# Patient Record
Sex: MALE | Race: WHITE | NOT HISPANIC OR LATINO | Employment: FULL TIME | RURAL
[De-identification: names, ages, dates, MRNs, and addresses within clinical notes are randomized per-mention and may not be internally consistent; named-entity substitution may affect disease eponyms.]

---

## 2023-07-25 ENCOUNTER — OFFICE VISIT (OUTPATIENT)
Dept: FAMILY MEDICINE CLINIC | Facility: CLINIC | Age: 28
End: 2023-07-25
Payer: COMMERCIAL

## 2023-07-25 VITALS
SYSTOLIC BLOOD PRESSURE: 110 MMHG | DIASTOLIC BLOOD PRESSURE: 60 MMHG | BODY MASS INDEX: 24.35 KG/M2 | HEART RATE: 76 BPM | WEIGHT: 189.7 LBS | HEIGHT: 74 IN | OXYGEN SATURATION: 95 %

## 2023-07-25 DIAGNOSIS — Z00.01 ENCOUNTER FOR GENERAL ADULT MEDICAL EXAMINATION WITH ABNORMAL FINDINGS: Primary | ICD-10-CM

## 2023-07-25 DIAGNOSIS — Z13.1 SCREENING FOR DIABETES MELLITUS: ICD-10-CM

## 2023-07-25 DIAGNOSIS — Z11.59 NEED FOR HEPATITIS C SCREENING TEST: ICD-10-CM

## 2023-07-25 DIAGNOSIS — E55.9 VITAMIN D DEFICIENCY: ICD-10-CM

## 2023-07-25 DIAGNOSIS — Z98.890 HISTORY OF CRANIOPLASTY: ICD-10-CM

## 2023-07-25 DIAGNOSIS — L23.7 ALLERGIC CONTACT DERMATITIS DUE TO PLANTS, EXCEPT FOOD: ICD-10-CM

## 2023-07-25 DIAGNOSIS — Z13.220 SCREENING FOR HYPERLIPIDEMIA: ICD-10-CM

## 2023-07-25 DIAGNOSIS — N52.9 ERECTILE DYSFUNCTION, UNSPECIFIED ERECTILE DYSFUNCTION TYPE: ICD-10-CM

## 2023-07-25 DIAGNOSIS — K21.9 GASTROESOPHAGEAL REFLUX DISEASE WITHOUT ESOPHAGITIS: ICD-10-CM

## 2023-07-25 PROCEDURE — 90715 TDAP VACCINE 7 YRS/> IM: CPT | Performed by: FAMILY MEDICINE

## 2023-07-25 PROCEDURE — 99385 PREV VISIT NEW AGE 18-39: CPT | Performed by: FAMILY MEDICINE

## 2023-07-25 PROCEDURE — 90471 IMMUNIZATION ADMIN: CPT | Performed by: FAMILY MEDICINE

## 2023-07-25 RX ORDER — TRIAMCINOLONE ACETONIDE 1 MG/G
1 CREAM TOPICAL 2 TIMES DAILY PRN
Qty: 80 G | Refills: 3 | Status: SHIPPED | OUTPATIENT
Start: 2023-07-25

## 2023-07-25 RX ORDER — TADALAFIL 5 MG/1
5 TABLET ORAL DAILY
Qty: 90 TABLET | Refills: 3 | Status: SHIPPED | OUTPATIENT
Start: 2023-07-25

## 2023-07-25 NOTE — PROGRESS NOTES
"Chief Complaint  Establish Care and Annual Exam    Subjective      Josse Kerr presents to [facility]  History of Present Illness  Patient is new to our practice and he comes in today for annual physical exam and to get established.  He says he feels well overall, although he is getting over some poison ivy on his left hand and forearm and would like prescription cortisone cream for this.  He has a history of erectile dysfunction and has been on tadalafil 5 mg daily for the last few years, and this works well with no adverse effects.    The patient says that he played soccer in high school and received an elbow to the head when he was 15 years of age, causing a \"dent in my skull\".  He is under the impression that there was no fracture, but I do not have records to review at this time.  It sounds like he had a depressed skull fracture, as he states that he subsequently underwent surgery and had a metal plate placed in his head.  However, he seems to indicate that the metal plate was placed mostly for cosmetic purposes, although the history of this is unclear since he was only 15 years of age.  Nonetheless, he says it was an outpatient surgery and he did not require any hospitalization.  He has had vitamin D deficiency in the past, and has GERD but is well controlled with over-the-counter Prilosec 20 mg, which she had been taking daily but now just takes it a couple days a week.  Patient is unsure of his last tetanus booster, so we discussed the importance of getting these every decade and he would like to get one today  Objective   Vital Signs:   Vitals:    07/25/23 1017   BP: 110/60   BP Location: Left arm   Patient Position: Sitting   Cuff Size: Adult   Pulse: 76   SpO2: 95%   Weight: 86 kg (189 lb 11.2 oz)   Height: 188 cm (74\")      /60 (BP Location: Left arm, Patient Position: Sitting, Cuff Size: Adult)   Pulse 76   Ht 188 cm (74\")   Wt 86 kg (189 lb 11.2 oz)   SpO2 95% "   BMI 24.36 kg/m²     Body mass index is 24.36 kg/m².    Review of Systems   Constitutional:  Negative for activity change, appetite change, chills, fever, unexpected weight gain and unexpected weight loss.   HENT:  Negative for ear discharge, ear pain, mouth sores, nosebleeds, rhinorrhea, sinus pressure, sore throat, swollen glands, trouble swallowing and voice change.    Eyes:  Negative for blurred vision, double vision, pain, redness and visual disturbance.   Respiratory:  Negative for cough, chest tightness, shortness of breath and wheezing.    Cardiovascular:  Negative for chest pain, palpitations and leg swelling.        PND, orthopnea   Gastrointestinal:  Positive for GERD. Negative for abdominal distention, abdominal pain, blood in stool, constipation, diarrhea, nausea and vomiting.        Dysphagia, odynophagia   Endocrine: Negative for polydipsia, polyphagia and polyuria.   Genitourinary:  Positive for erectile dysfunction. Negative for dysuria, frequency, hematuria, nocturia, testicular pain and urinary incontinence.   Musculoskeletal:  Negative for arthralgias (unusual/atypica), back pain, gait problem, joint swelling, myalgias and neck pain.   Skin:  Positive for rash. Negative for skin lesions (worrisome/suspicious) and bruise.   Allergic/Immunologic: Negative for food allergies.   Neurological:  Negative for dizziness, tremors, seizures, syncope, weakness, numbness, headache and memory problem.   Hematological:  Negative for adenopathy. Does not bruise/bleed easily.   Psychiatric/Behavioral:  Negative for sleep disturbance, suicidal ideas and depressed mood. The patient is not nervous/anxious.      Past History:  Medical History: has a past medical history of Concussion, Erectile dysfunction, GERD (gastroesophageal reflux disease), History of depression (2013), and Vitamin D deficiency.   Surgical History: has a past surgical history that includes Skull fracture elevation (2010).   Family History:  family history includes Breast cancer in his mother; Colon cancer in his maternal grandfather; ROSALIA disease in his father; Hyperlipidemia in his father; Hypertension in his father; Nephrolithiasis in his father; No Known Problems in his brother.   Social History: reports that he has never smoked. He has never used smokeless tobacco. He reports current alcohol use. He reports that he does not use drugs.      Current Outpatient Medications:     tadalafil (Cialis) 5 MG tablet, Take 1 tablet by mouth Daily., Disp: 90 tablet, Rfl: 3    triamcinolone (KENALOG) 0.1 % cream, Apply 1 application  topically to the appropriate area as directed 2 (Two) Times a Day As Needed for Rash (poison ivy/contact dermatitis)., Disp: 80 g, Rfl: 3    Allergies: Patient has no known allergies.    Physical Exam  Constitutional:       General: He is not in acute distress.     Appearance: Normal appearance. He is not ill-appearing, toxic-appearing or diaphoretic.   HENT:      Head: Normocephalic and atraumatic.      Comments: Patient points out the areas where the incision was made to have the plate placed in the right frontal part of the skull, but they are very well hidden by the skin lines and not barely visible     Right Ear: Tympanic membrane, ear canal and external ear normal.      Left Ear: Tympanic membrane, ear canal and external ear normal.      Nose: Nose normal.      Mouth/Throat:      Mouth: Mucous membranes are moist.      Pharynx: Oropharynx is clear.   Eyes:      General: No scleral icterus.     Extraocular Movements: Extraocular movements intact.      Conjunctiva/sclera: Conjunctivae normal.      Pupils: Pupils are equal, round, and reactive to light.   Neck:      Vascular: No carotid bruit.   Cardiovascular:      Rate and Rhythm: Normal rate and regular rhythm.      Pulses: Normal pulses.      Heart sounds: Normal heart sounds. No murmur heard.    No gallop.   Pulmonary:      Effort: Pulmonary effort is normal.      Breath  sounds: Normal breath sounds. No wheezing, rhonchi or rales.   Chest:      Chest wall: No tenderness.   Abdominal:      General: Bowel sounds are normal. There is no distension.      Palpations: Abdomen is soft. There is no mass.      Tenderness: There is no abdominal tenderness. There is no guarding or rebound.   Musculoskeletal:         General: No swelling, tenderness or deformity. Normal range of motion.      Cervical back: Neck supple. No rigidity.      Right lower leg: No edema.      Left lower leg: No edema.   Lymphadenopathy:      Cervical: No cervical adenopathy.   Skin:     General: Skin is warm and dry.      Capillary Refill: Capillary refill takes less than 2 seconds.      Coloration: Skin is not pale.      Findings: Rash (Few scattered air with him and his ampuls vesicles on a couple of the right fingers and forearm systemic contact Derm Scott) present. No erythema.   Neurological:      General: No focal deficit present.      Mental Status: He is alert and oriented to person, place, and time.      Cranial Nerves: No cranial nerve deficit.      Sensory: No sensory deficit.      Motor: No weakness.      Coordination: Coordination normal.      Gait: Gait normal.      Deep Tendon Reflexes: Reflexes normal.   Psychiatric:         Mood and Affect: Mood normal.         Behavior: Behavior normal.         Thought Content: Thought content normal.         Judgment: Judgment normal.                 Assessment and Plan   Diagnoses and all orders for this visit:    1. Encounter for general adult medical examination with abnormal findings (Primary)  -     CBC & Differential  -     Comprehensive Metabolic Panel  Healthy lifestyle measures including healthy diet regular exercise were discussed, and preventive healthcare measures were also discussed.  The patient states that he and his wife of been working on eating a healthier diet lately and he is feeling good, and has a lot less GERD symptoms because of this.  He will  get a tetanus booster today.  We will check routine labs and refill his current medications.  I will see him back annually or sooner if needed  2. Screening for hyperlipidemia  -     Lipid Panel    3. Screening for diabetes mellitus  -     Hemoglobin A1c    4. Erectile dysfunction, unspecified erectile dysfunction type  Patient does fine with tadalafil 5 mg daily and will continue  5. Allergic contact dermatitis due to plants, except food  Triamcinolone 0.1% cream to apply to affected areas twice a day as needed, and prevention measures discussed  6. Need for hepatitis C screening test  -     Hepatitis C Antibody    7. Vitamin D deficiency  -     Vitamin D,25-Hydroxy  Management discussed  8. History of cranioplasty    9. Gastroesophageal reflux disease without esophagitis  Symptoms have improved with lifestyle modification although he still takes Prilosec as needed, and will continue to use this unless problems worsen.  GERD management was discussed and if he develops symptoms more frequently he will need take the medicine daily  Other orders  -     Tdap Vaccine => 6yo IM (BOOSTRIX)  -     tadalafil (Cialis) 5 MG tablet; Take 1 tablet by mouth Daily.  Dispense: 90 tablet; Refill: 3  -     triamcinolone (KENALOG) 0.1 % cream; Apply 1 application  topically to the appropriate area as directed 2 (Two) Times a Day As Needed for Rash (poison ivy/contact dermatitis).  Dispense: 80 g; Refill: 3            Follow Up   No follow-ups on file.  Patient was given instructions and counseling regarding his condition or for health maintenance advice. Please see specific information pulled into the AVS if appropriate.     Sudarshan Marin MD

## 2023-12-14 ENCOUNTER — OFFICE VISIT (OUTPATIENT)
Dept: FAMILY MEDICINE CLINIC | Facility: CLINIC | Age: 28
End: 2023-12-14
Payer: COMMERCIAL

## 2023-12-14 VITALS
HEART RATE: 77 BPM | DIASTOLIC BLOOD PRESSURE: 72 MMHG | HEIGHT: 73 IN | WEIGHT: 203.3 LBS | SYSTOLIC BLOOD PRESSURE: 120 MMHG | OXYGEN SATURATION: 98 % | BODY MASS INDEX: 26.95 KG/M2

## 2023-12-14 DIAGNOSIS — F63.3 TRICHOTILLOMANIA: ICD-10-CM

## 2023-12-14 DIAGNOSIS — F41.1 GAD (GENERALIZED ANXIETY DISORDER): Primary | ICD-10-CM

## 2023-12-14 PROCEDURE — 99214 OFFICE O/P EST MOD 30 MIN: CPT | Performed by: FAMILY MEDICINE

## 2023-12-14 NOTE — PROGRESS NOTES
Chief Complaint  Discuss letter     Subjective      Josse Kerr presents to [facility]  History of Present Illness  Patient is here to discuss anxiety symptoms.  He has actually had symptoms since the episode of depression that he had back during his third year of college, but they have never been protracted or severe enough to seek medical attention for.  He has only had 1 panic attack.  He does fidget a lot, and has times where he feels very restless and nervous and has to get up and move around or exert energy.  He does have a tendency to pluck his beard hairs compulsively, sometimes is aware of this and sometimes not, and has done so to the point where there is some patchy areas at times.  He denies OCD issues.  He has some difficulty focusing at times, but denies any historical markers for attention deficit disorder that sound worrisome, as he did well academically in school.  We discussed the fact that anxiety disorders in General respond very well to counseling and that the patient should have an appointment for counseling and he is agreeable with this.  He denies any depression issues, although he does have some days when he feels more down than others, but it is never more than part of the day, it never lasts.  The patient does have 2 dogs and he says the dogs really help a lot when he feels anxious or stressed, and he would like a letter for emotional support animals to give to his new [facility], where he will be moving in the next 2 weeks.  I discussed with him at length the fact that there is a difference between a therapy animal in an emotional support animal, and he does understand this.  Patient also understands that some states require the patient to be actively involved in some kind of therapy or treatment for the anxiety in order to qualify for an emotional support animal.  Today's visit was solely spent in education and counseling about these issues.  Since the  "patient did not have labs drawn when he was here a few months ago for initial exam, he agreed to do those today.  He did have an episode of near syncope following the lab work from vasovagal hypotension, but he recovered with rest and fluids and did fine on his way out.  He was cautioned about episodes like this recurring in the future and prevention discussed.  Objective   Vital Signs:   Vitals:    12/14/23 1333   BP: 120/72   BP Location: Left arm   Patient Position: Sitting   Cuff Size: Adult   Pulse: 77   SpO2: 98%   Weight: 92.2 kg (203 lb 4.8 oz)   Height: 185.4 cm (73\")      /72 (BP Location: Left arm, Patient Position: Sitting, Cuff Size: Adult)   Pulse 77   Ht 185.4 cm (73\")   Wt 92.2 kg (203 lb 4.8 oz)   SpO2 98%   BMI 26.82 kg/m²     Body mass index is 26.82 kg/m².    Review of Systems    Past History:  Medical History: has a past medical history of Concussion, Erectile dysfunction, TARAN (generalized anxiety disorder) (2023), GERD (gastroesophageal reflux disease), History of depression (2013), and Vitamin D deficiency.   Surgical History: has a past surgical history that includes Skull fracture elevation (2010).   Family History: family history includes Breast cancer in his mother; Colon cancer in his maternal grandfather; ROSALIA disease in his father; Hyperlipidemia in his father; Hypertension in his father; Nephrolithiasis in his father; No Known Problems in his brother.   Social History: reports that he has never smoked. He has never used smokeless tobacco. He reports current alcohol use. He reports that he does not use drugs.      Current Outpatient Medications:     tadalafil (Cialis) 5 MG tablet, Take 1 tablet by mouth Daily., Disp: 90 tablet, Rfl: 3    triamcinolone (KENALOG) 0.1 % cream, Apply 1 application  topically to the appropriate area as directed 2 (Two) Times a Day As Needed for Rash (poison ivy/contact dermatitis)., Disp: 80 g, Rfl: 3    Allergies: Patient has no known " allergies.    Physical Exam  Constitutional:       General: He is not in acute distress.     Appearance: Normal appearance. He is normal weight. He is not ill-appearing or toxic-appearing.   HENT:      Head: Normocephalic.      Right Ear: External ear normal.      Left Ear: External ear normal.      Nose: Nose normal. No congestion or rhinorrhea.      Mouth/Throat:      Mouth: Mucous membranes are moist.      Pharynx: Oropharynx is clear.   Eyes:      General: No scleral icterus.        Right eye: No discharge.         Left eye: No discharge.      Extraocular Movements: Extraocular movements intact.      Conjunctiva/sclera: Conjunctivae normal.      Pupils: Pupils are equal, round, and reactive to light.   Cardiovascular:      Rate and Rhythm: Normal rate and regular rhythm.      Pulses: Normal pulses.      Heart sounds: Normal heart sounds.   Pulmonary:      Effort: Pulmonary effort is normal.      Breath sounds: Normal breath sounds.   Musculoskeletal:         General: Normal range of motion.      Cervical back: Normal range of motion.      Right lower leg: No edema.      Left lower leg: No edema.   Skin:     General: Skin is warm and dry.      Capillary Refill: Capillary refill takes less than 2 seconds.      Coloration: Skin is not jaundiced or pale.      Findings: No bruising, erythema or rash.   Neurological:      General: No focal deficit present.      Mental Status: He is alert and oriented to person, place, and time. Mental status is at baseline.      Cranial Nerves: No cranial nerve deficit.      Motor: No weakness.      Gait: Gait normal.   Psychiatric:         Attention and Perception: Attention and perception normal.         Mood and Affect: Mood is anxious. Mood is not depressed.         Speech: Speech normal.         Behavior: Behavior normal.         Thought Content: Thought content normal.         Cognition and Memory: Cognition and memory normal.      Comments: Expresses some mild generalized  anxiety features                   Assessment and Plan   Diagnoses and all orders for this visit:    1. TARAN (generalized anxiety disorder) (Primary)  -     Ambulatory Referral to Psychology  Treatment options and differential diagnosis were discussed.  Patient does not think that he needs or wants medication at this time, but definitely wishes to see a psychologist or counselor for evaluation and therapy, which is strongly, strongly recommended.  He denies any insomnia, and does not have difficulties functioning at work or at home.  I did provide a letter stating that his dogs served as emotional support animals in his case, but if something more detailed is required he will let me know.  If he decides that he would like to try medical therapy, or he and the counselor agree that he needs medical therapy, that he will return to discuss this.  Likewise, if his depression symptoms become more problematic he will return.  2. Trichotillomania  -     Ambulatory Referral to Psychology  We discussed the fact that this often responds to antianxiety medications such as SSRIs, but he said the problem is not severe enough that he requires medication at this time.      I spent 35 minutes caring for Josse on this date of service. This time includes time spent by me in the following activities:preparing for the visit, obtaining and/or reviewing a separately obtained history, performing a medically appropriate examination and/or evaluation , counseling and educating the patient/family/caregiver, ordering medications, tests, or procedures, referring and communicating with other health care professionals , documenting information in the medical record, and care coordination    Follow Up   No follow-ups on file.  Patient was given instructions and counseling regarding his condition or for health maintenance advice. Please see specific information pulled into the AVS if appropriate.     Sudarshan Marin MD

## 2023-12-15 DIAGNOSIS — R74.8 ELEVATED LIVER ENZYMES: Primary | ICD-10-CM

## 2023-12-15 LAB
25(OH)D3+25(OH)D2 SERPL-MCNC: 25.1 NG/ML (ref 30–100)
ALBUMIN SERPL-MCNC: 4.7 G/DL (ref 4.3–5.2)
ALBUMIN/GLOB SERPL: 1.5 {RATIO} (ref 1.2–2.2)
ALP SERPL-CCNC: 146 IU/L (ref 44–121)
ALT SERPL-CCNC: 17 IU/L (ref 0–44)
AST SERPL-CCNC: 12 IU/L (ref 0–40)
BASOPHILS # BLD AUTO: 0 X10E3/UL (ref 0–0.2)
BASOPHILS NFR BLD AUTO: 1 %
BILIRUB SERPL-MCNC: 0.2 MG/DL (ref 0–1.2)
BUN SERPL-MCNC: 15 MG/DL (ref 6–20)
BUN/CREAT SERPL: 20 (ref 9–20)
CALCIUM SERPL-MCNC: 9.8 MG/DL (ref 8.7–10.2)
CHLORIDE SERPL-SCNC: 100 MMOL/L (ref 96–106)
CHOLEST SERPL-MCNC: 229 MG/DL (ref 100–199)
CO2 SERPL-SCNC: 24 MMOL/L (ref 20–29)
CREAT SERPL-MCNC: 0.75 MG/DL (ref 0.76–1.27)
EGFRCR SERPLBLD CKD-EPI 2021: 125 ML/MIN/1.73
EOSINOPHIL # BLD AUTO: 0.2 X10E3/UL (ref 0–0.4)
EOSINOPHIL NFR BLD AUTO: 2 %
ERYTHROCYTE [DISTWIDTH] IN BLOOD BY AUTOMATED COUNT: 13 % (ref 11.6–15.4)
GLOBULIN SER CALC-MCNC: 3.2 G/DL (ref 1.5–4.5)
GLUCOSE SERPL-MCNC: 89 MG/DL (ref 70–99)
HBA1C MFR BLD: 5.9 % (ref 4.8–5.6)
HCT VFR BLD AUTO: 40.2 % (ref 37.5–51)
HCV IGG SERPL QL IA: NON REACTIVE
HDLC SERPL-MCNC: 42 MG/DL
HGB BLD-MCNC: 13.6 G/DL (ref 13–17.7)
IMM GRANULOCYTES # BLD AUTO: 0 X10E3/UL (ref 0–0.1)
IMM GRANULOCYTES NFR BLD AUTO: 0 %
LDLC SERPL CALC-MCNC: 162 MG/DL (ref 0–99)
LYMPHOCYTES # BLD AUTO: 2.7 X10E3/UL (ref 0.7–3.1)
LYMPHOCYTES NFR BLD AUTO: 36 %
MCH RBC QN AUTO: 29.3 PG (ref 26.6–33)
MCHC RBC AUTO-ENTMCNC: 33.8 G/DL (ref 31.5–35.7)
MCV RBC AUTO: 87 FL (ref 79–97)
MONOCYTES # BLD AUTO: 0.7 X10E3/UL (ref 0.1–0.9)
MONOCYTES NFR BLD AUTO: 9 %
NEUTROPHILS # BLD AUTO: 3.9 X10E3/UL (ref 1.4–7)
NEUTROPHILS NFR BLD AUTO: 52 %
PLATELET # BLD AUTO: 278 X10E3/UL (ref 150–450)
POTASSIUM SERPL-SCNC: 4.5 MMOL/L (ref 3.5–5.2)
PROT SERPL-MCNC: 7.9 G/DL (ref 6–8.5)
RBC # BLD AUTO: 4.64 X10E6/UL (ref 4.14–5.8)
SODIUM SERPL-SCNC: 141 MMOL/L (ref 134–144)
TRIGL SERPL-MCNC: 138 MG/DL (ref 0–149)
VLDLC SERPL CALC-MCNC: 25 MG/DL (ref 5–40)
WBC # BLD AUTO: 7.5 X10E3/UL (ref 3.4–10.8)

## 2024-08-14 ENCOUNTER — TELEPHONE (OUTPATIENT)
Dept: FAMILY MEDICINE CLINIC | Facility: CLINIC | Age: 29
End: 2024-08-14
Payer: COMMERCIAL

## 2024-08-29 RX ORDER — TADALAFIL 5 MG/1
5 TABLET ORAL DAILY
Qty: 90 TABLET | Refills: 3 | OUTPATIENT
Start: 2024-08-29

## 2024-08-29 NOTE — TELEPHONE ENCOUNTER
Caller: Buddy Kerrua    Relationship: Self    Best call back number: [phone]     Requested Prescriptions:   Requested Prescriptions     Pending Prescriptions Disp Refills    tadalafil (Cialis) 5 MG tablet 90 tablet 3     Sig: Take 1 tablet by mouth Daily.        Pharmacy where request should be sent: [facility] [id number] [address] & [facility] [phone] [facility] [phone]      Last office visit with prescribing clinician: 12/14/2023   Last telemedicine visit with prescribing clinician: Visit date not found   Next office visit with prescribing clinician: Visit date not found         Does the patient have less than a 3 day supply:  [x] Yes  [] No    Would you like a call back once the refill request has been completed: [] Yes [x] No    If the office needs to give you a call back, can they leave a voicemail: [] Yes [x] No    Florencia Molina Rep   08/29/24 12:28 EDT

## 2024-11-26 ENCOUNTER — TELEPHONE (OUTPATIENT)
Dept: FAMILY MEDICINE CLINIC | Facility: CLINIC | Age: 29
End: 2024-11-26
Payer: COMMERCIAL

## 2024-11-26 NOTE — TELEPHONE ENCOUNTER
Called pt regarding overdue labs that need to be completed. Pt states that at this time, he cannot have his labs completed. But states he plans to call and schedule a Annual physical at the beginning of the year and will complete labs then.

## 2025-05-05 ENCOUNTER — TELEPHONE (OUTPATIENT)
Dept: FAMILY MEDICINE CLINIC | Facility: CLINIC | Age: 30
End: 2025-05-05
Payer: COMMERCIAL